# Patient Record
Sex: MALE | Race: ASIAN | NOT HISPANIC OR LATINO | URBAN - METROPOLITAN AREA
[De-identification: names, ages, dates, MRNs, and addresses within clinical notes are randomized per-mention and may not be internally consistent; named-entity substitution may affect disease eponyms.]

---

## 2017-11-06 ENCOUNTER — EMERGENCY (EMERGENCY)
Facility: HOSPITAL | Age: 27
LOS: 1 days | Discharge: HOME | End: 2017-11-06
Attending: EMERGENCY MEDICINE | Admitting: EMERGENCY MEDICINE
Payer: SELF-PAY

## 2017-11-06 VITALS
RESPIRATION RATE: 17 BRPM | OXYGEN SATURATION: 98 % | TEMPERATURE: 98 F | HEART RATE: 98 BPM | DIASTOLIC BLOOD PRESSURE: 80 MMHG | SYSTOLIC BLOOD PRESSURE: 131 MMHG

## 2017-11-06 VITALS
RESPIRATION RATE: 25 BRPM | OXYGEN SATURATION: 98 % | WEIGHT: 149.91 LBS | HEIGHT: 69 IN | SYSTOLIC BLOOD PRESSURE: 161 MMHG | DIASTOLIC BLOOD PRESSURE: 118 MMHG | HEART RATE: 97 BPM | TEMPERATURE: 98 F

## 2017-11-06 PROCEDURE — 99284 EMERGENCY DEPT VISIT MOD MDM: CPT | Mod: 25

## 2017-11-06 NOTE — ED PROVIDER NOTE - NS_ ATTENDINGSCRIBEDETAILS _ED_A_ED_FT
Wyatt Viramontes MD - The scribe's documentation has been prepared under my direction and personally reviewed by me in its entirety. I confirm that the note above accurately reflects all work, treatment, procedures, and medical decision making performed by me.

## 2017-11-06 NOTE — ED ADULT NURSE NOTE - CHPI ED SYMPTOMS NEG
no dizziness/no fever/no nausea/no numbness/no pain/no tingling/no vomiting/no weakness/no chills/no decreased eating/drinking

## 2017-11-06 NOTE — ED PROVIDER NOTE - OBJECTIVE STATEMENT
26 y/o M pt w/ no significant PMHx presents to the ED BIB police. Pt's brother called police today because he saw that the pt was snorting ketamine and acting strangely. Pt says he thinks he used ketamine approximately an hour ago and denies use of other drugs. Pt states he does not know what he did to make his brother call the police and he is upset that he called, states he feels fine and wants to go home. Pt admits to snorting ketamine in the past. Pt denies n/v, confusion, agitation, HA or any other complaints at this time.

## 2017-11-06 NOTE — ED PROVIDER NOTE - MEDICAL DECISION MAKING DETAILS
Patient is alert and oriented, ambulating without difficulty. Wants to go home. Does not want any testing. Will call for family to arrange transport home

## 2017-11-06 NOTE — ED ADULT NURSE REASSESSMENT NOTE - NS ED NURSE REASSESS COMMENT FT1
Afebrile vital signs stable. Pt denies all complaints states he wants to go home. Pt alert oriented x 3. JUSTIN. Dr Viramontes reexamined pt and stated he can be discharged. Offered Pt transportation home but he declined. Dr Viramontes aware and stated pt could leave.

## 2017-11-06 NOTE — ED ADULT NURSE NOTE - OBJECTIVE STATEMENT
Pt presents to ED. States he was upset because he was given probation for counterfit checks. States he "snorted Ketamine" approximately one hour prior to arrival. States he has done this before ut denies all other drug use or alcohol. Alert oriented x3 JUSTIN. Skin warm and dry to touch color good. CM rsr with no ectopics noted.

## 2017-11-06 NOTE — ED PROVIDER NOTE - PSYCHIATRIC, MLM
Alert and oriented to person, place, time/situation. normal mood and affect. no apparent risk to self or others. Calm and cooperative, slightly upset.

## 2020-07-16 ENCOUNTER — EMERGENCY (EMERGENCY)
Facility: HOSPITAL | Age: 30
LOS: 1 days | Discharge: HOME | End: 2020-07-16
Attending: EMERGENCY MEDICINE | Admitting: EMERGENCY MEDICINE
Payer: MEDICAID

## 2020-07-16 VITALS
TEMPERATURE: 97 F | OXYGEN SATURATION: 98 % | HEART RATE: 82 BPM | SYSTOLIC BLOOD PRESSURE: 115 MMHG | DIASTOLIC BLOOD PRESSURE: 84 MMHG | RESPIRATION RATE: 16 BRPM

## 2020-07-16 VITALS
HEIGHT: 69 IN | TEMPERATURE: 98 F | HEART RATE: 87 BPM | SYSTOLIC BLOOD PRESSURE: 123 MMHG | DIASTOLIC BLOOD PRESSURE: 92 MMHG | OXYGEN SATURATION: 99 % | WEIGHT: 134.92 LBS | RESPIRATION RATE: 18 BRPM

## 2020-07-16 PROCEDURE — 99283 EMERGENCY DEPT VISIT LOW MDM: CPT

## 2020-07-16 PROCEDURE — 99283 EMERGENCY DEPT VISIT LOW MDM: CPT | Mod: 25

## 2020-07-16 PROCEDURE — 74019 RADEX ABDOMEN 2 VIEWS: CPT | Mod: 26

## 2020-07-16 PROCEDURE — 74019 RADEX ABDOMEN 2 VIEWS: CPT

## 2020-07-16 NOTE — ED PROVIDER NOTE - CARE PROVIDER_API CALL
Ari Benjamin ()  Internal Medicine  [address] [place] [address]  Phone: [phone]  Fax: [phone]  Follow Up Time: 7-10 Days

## 2020-07-16 NOTE — ED PROVIDER NOTE - OBJECTIVE STATEMENT
29 y.o. M concerned that he swallowed a piece of jewelry 29 y.o. M concerned that he swallowed a piece of jewelry - states he thinks his girlfriend put her belly button ring (metal with fake gene) into a cup of water which he drank this morning, he has not passed it, states his stomach is uncomfortable, no vomiting

## 2020-07-16 NOTE — ED ADULT TRIAGE NOTE - CHIEF COMPLAINT QUOTE
"I swallowed a pieced of metal" "I swallowed a piece of metal" pt reports he drank some water this morning that had a piece of jewelry in it

## 2020-07-16 NOTE — ED ADULT NURSE NOTE - OBJECTIVE STATEMENT
28yo male walked into ED, pt c/o "I swallowed my girlfriends bell button ring" as per pt. pt indicates he swallowed water that contained his girlfriends belly button ring.

## 2020-07-16 NOTE — ED ADULT NURSE NOTE - CHIEF COMPLAINT QUOTE
"I swallowed a piece of metal" pt reports he drank some water this morning that had a piece of jewelry in it

## 2020-07-16 NOTE — ED PROVIDER NOTE - PROGRESS NOTE DETAILS
d/w pt that FB appears to have moved through most of GI tract already, likely pass in stool in next few days, pt can look for FB, if does not pass in 1 wk f/u GI, if acute GI symptoms as discussed f/u immediately

## 2020-07-16 NOTE — ED PROVIDER NOTE - PATIENT PORTAL LINK FT
You can access the FollowMyHealth Patient Portal offered by [facility] by registering at the following website: [email or web address]. By joining [employer]’s FollowMyHealth portal, you will also be able to view your health information using other applications (apps) compatible with our system.

## 2022-07-10 NOTE — ED ADULT NURSE NOTE - CHIEF COMPLAINT QUOTE
**MARIA DE JESUS Gaitan CNP,           MARIA DE JESUS Gaitan CNP  07/10/22 1371 " His brother called 911 because he was snorting ketamine . He was acting bizarre "

## 2023-04-03 NOTE — ED ADULT TRIAGE NOTE - HEIGHT IN CM
Goal Outcome Evaluation:  A/Ox4, forgetful. Complain of abd pain.  PRN Tylenol given. CVC double lumen. Turn/Repo Q2H  but pt refused. VSS on RA. NPO, Continue TF running at 50 ml/hr. B/B incontinent. BLE lymphoedema. Discharge pending placement.                       [phone]

## 2025-05-23 NOTE — ED ADULT NURSE NOTE - NSSUHOSCREENINGYN_ED_ALL_ED
Yes - the patient is able to be screened
What Type Of Note Output Would You Prefer (Optional)?: Bullet Format
Hpi Title: Evaluation of a Skin Lesion